# Patient Record
Sex: FEMALE | Race: ASIAN | NOT HISPANIC OR LATINO | URBAN - METROPOLITAN AREA
[De-identification: names, ages, dates, MRNs, and addresses within clinical notes are randomized per-mention and may not be internally consistent; named-entity substitution may affect disease eponyms.]

---

## 2023-02-03 ENCOUNTER — OFFICE (OUTPATIENT)
Dept: URBAN - METROPOLITAN AREA CLINIC 11 | Facility: CLINIC | Age: 58
End: 2023-02-03
Payer: COMMERCIAL

## 2023-02-03 VITALS
HEART RATE: 75 BPM | SYSTOLIC BLOOD PRESSURE: 107 MMHG | OXYGEN SATURATION: 98 % | WEIGHT: 102 LBS | DIASTOLIC BLOOD PRESSURE: 72 MMHG

## 2023-02-03 DIAGNOSIS — Z86.010 PERSONAL HISTORY OF COLONIC POLYPS: ICD-10-CM

## 2023-02-03 DIAGNOSIS — F17.200 NICOTINE DEPENDENCE, UNSPECIFIED, UNCOMPLICATED: ICD-10-CM

## 2023-02-03 PROCEDURE — 99203 OFFICE O/P NEW LOW 30 MIN: CPT | Performed by: STUDENT IN AN ORGANIZED HEALTH CARE EDUCATION/TRAINING PROGRAM

## 2023-02-03 RX ORDER — SODIUM PICOSULFATE, MAGNESIUM OXIDE, AND ANHYDROUS CITRIC ACID 10; 3.5; 12 MG/160ML; G/160ML; G/160ML
LIQUID ORAL
Qty: 320 | Refills: 0 | Status: COMPLETED
Start: 2023-02-03 | End: 2023-03-06

## 2023-02-03 NOTE — SERVICENOTES
Given her history of polyps of unknown pathology and she reports the need for 5 year interval since her last colonoscopy , will schedule her for screening colonoscopy.  She will likely need a pediatric colonoscope due to her small body habitus.  Counseled the patient on smoking cessation.  Will perform her colonoscopy and she can follow up as needed after that.  I discussed with the patient the risks and benefits of colonoscopy including bleeding, infection, perforation, anesthesia related complications.  Patient agrees to proceed with the planned procedure.

## 2023-02-03 NOTE — SERVICEHPINOTES
Ms. Susan Sotelo is a 57-year-old [place] female with past medical history of breast cancer status post chemotherapy and surgical intervention in 2011 currently on tamoxifen, who presents to GI clinic as a referral for colon cancer screening patient reports that overall she feels well.  Her weight is stable, no bleeding symptoms, no abdominal pain, nausea or vomiting, no dysphagia or heartburn.  She does not report being anemic in the past.  Her last colonoscopy was by Dr. Spencer  5 years ago.  She reports that she thinks she had some polyps but they told her to Repeat her colonoscopy in 5 years.  she is here with her daughter-in-law today.  Previous abdominal surgeries include a hysterectomy.  She smokes 5 cigarettes per day, drinks 3 7 oz corona beers at night, does not use recreational drugs.  No family history of GI malignancies or liver disease.

## 2023-03-06 ENCOUNTER — AMBULATORY SURGICAL CENTER (OUTPATIENT)
Dept: URBAN - METROPOLITAN AREA SURGERY 3 | Facility: SURGERY | Age: 58
End: 2023-03-06
Payer: COMMERCIAL

## 2023-03-06 ENCOUNTER — OFFICE (OUTPATIENT)
Dept: URBAN - METROPOLITAN AREA PATHOLOGY 22 | Facility: PATHOLOGY | Age: 58
End: 2023-03-06
Payer: COMMERCIAL

## 2023-03-06 VITALS
DIASTOLIC BLOOD PRESSURE: 64 MMHG | HEART RATE: 69 BPM | OXYGEN SATURATION: 100 % | TEMPERATURE: 98.3 F | RESPIRATION RATE: 17 BRPM | SYSTOLIC BLOOD PRESSURE: 93 MMHG | SYSTOLIC BLOOD PRESSURE: 104 MMHG | DIASTOLIC BLOOD PRESSURE: 65 MMHG | SYSTOLIC BLOOD PRESSURE: 102 MMHG | HEART RATE: 67 BPM | SYSTOLIC BLOOD PRESSURE: 108 MMHG | HEART RATE: 68 BPM | OXYGEN SATURATION: 99 % | TEMPERATURE: 98.1 F | DIASTOLIC BLOOD PRESSURE: 73 MMHG | SYSTOLIC BLOOD PRESSURE: 106 MMHG | RESPIRATION RATE: 18 BRPM | DIASTOLIC BLOOD PRESSURE: 63 MMHG | WEIGHT: 101 LBS | RESPIRATION RATE: 16 BRPM | SYSTOLIC BLOOD PRESSURE: 94 MMHG | HEART RATE: 66 BPM | DIASTOLIC BLOOD PRESSURE: 59 MMHG | HEART RATE: 64 BPM | DIASTOLIC BLOOD PRESSURE: 66 MMHG

## 2023-03-06 DIAGNOSIS — Z86.010 PERSONAL HISTORY OF COLONIC POLYPS: ICD-10-CM

## 2023-03-06 DIAGNOSIS — K63.89 OTHER SPECIFIED DISEASES OF INTESTINE: ICD-10-CM

## 2023-03-06 PROBLEM — K63.5 POLYP OF COLON: Status: ACTIVE | Noted: 2023-03-06

## 2023-03-06 PROCEDURE — 45380 COLONOSCOPY AND BIOPSY: CPT | Performed by: STUDENT IN AN ORGANIZED HEALTH CARE EDUCATION/TRAINING PROGRAM

## 2023-03-06 NOTE — SERVICEHPINOTES
Ms. Susan Sotelo is a 57-year-old [place] female with past medical history of breast cancer status post chemotherapy and surgical intervention in 2011 currently on tamoxifen, who presents to GI lab for elective colonoscopy for colon cancer screening and personal h/o colon polyps.
br
br Per clinic visit 2/3/23:
brHer weight is stable, no bleeding symptoms, no abdominal pain, nausea or vomiting, no dysphagia or heartburn.  She does not report being anemic in the past.  Her last colonoscopy was by Dr. Spencer  5 years ago.  She reports that she thinks she had some polyps but they told her to Repeat her colonoscopy in 5 years.  she is here with her daughter-in-law today.  Previous abdominal surgeries include a hysterectomy.  She smokes 5 cigarettes per day, drinks 3 7 oz corona beers at night, does not use recreational drugs.  No family history of GI malignancies or liver disease.
br
br   Colonoscopy 3/6/23:  
br
Patient feeling well, took prep, no issues, ready for colonoscopy